# Patient Record
Sex: FEMALE
[De-identification: names, ages, dates, MRNs, and addresses within clinical notes are randomized per-mention and may not be internally consistent; named-entity substitution may affect disease eponyms.]

---

## 2024-08-19 ENCOUNTER — DASHBOARD ENCOUNTERS (OUTPATIENT)
Age: 54
End: 2024-08-19

## 2024-08-20 ENCOUNTER — OFFICE VISIT (OUTPATIENT)
Dept: URBAN - METROPOLITAN AREA CLINIC 63 | Facility: CLINIC | Age: 54
End: 2024-08-20

## 2024-08-20 NOTE — HPI-TODAY'S VISIT:
This is a very pleasant 53-[email or web address] female who presents to the office with a chief complaint of fatty liver.

## 2024-09-27 ENCOUNTER — OFFICE VISIT (OUTPATIENT)
Dept: URBAN - METROPOLITAN AREA CLINIC 60 | Facility: CLINIC | Age: 54
End: 2024-09-27

## 2024-09-27 RX ORDER — METHOCARBAMOL 750 MG/1
TABLET ORAL
Qty: 90 TABLET | Status: ACTIVE | COMMUNITY

## 2024-09-27 RX ORDER — IBUPROFEN 800 MG/1
TAKE 1 TABLET BY MOUTH EVERY 6 HOURS AS NEEDED TABLET, FILM COATED ORAL
Qty: 21 EACH | Refills: 0 | Status: ACTIVE | COMMUNITY

## 2024-09-27 RX ORDER — MECLIZINE HYDROCHLORIDE 25 MG/1
TAKE 1 TABLET BY MOUTH TWICE DAILY AS NEEDED TABLET ORAL
Qty: 30 EACH | Refills: 0 | Status: ACTIVE | COMMUNITY

## 2024-09-27 RX ORDER — TADALAFIL 20 MG/1
TAKE 1 TABLET BY MOUTH ONCE A DAY 30 MINUTES PRIOR TO ACT TABLET ORAL
Qty: 30 EACH | Refills: 2 | Status: ACTIVE | COMMUNITY

## 2024-09-27 RX ORDER — ERGOCALCIFEROL CAPSULES, 1.25 MG/1
CAPSULE ORAL
Qty: 12 CAPSULE | Status: ACTIVE | COMMUNITY